# Patient Record
Sex: FEMALE | Race: WHITE | NOT HISPANIC OR LATINO | Employment: UNEMPLOYED | URBAN - METROPOLITAN AREA
[De-identification: names, ages, dates, MRNs, and addresses within clinical notes are randomized per-mention and may not be internally consistent; named-entity substitution may affect disease eponyms.]

---

## 2024-02-12 ENCOUNTER — APPOINTMENT (OUTPATIENT)
Dept: RADIOLOGY | Facility: HOSPITAL | Age: 44
End: 2024-02-12
Payer: MEDICAID

## 2024-02-12 ENCOUNTER — HOSPITAL ENCOUNTER (EMERGENCY)
Facility: HOSPITAL | Age: 44
Discharge: HOME | End: 2024-02-12
Attending: EMERGENCY MEDICINE
Payer: MEDICAID

## 2024-02-12 VITALS
BODY MASS INDEX: 20.83 KG/M2 | OXYGEN SATURATION: 98 % | TEMPERATURE: 97.3 F | DIASTOLIC BLOOD PRESSURE: 80 MMHG | WEIGHT: 125 LBS | RESPIRATION RATE: 18 BRPM | HEART RATE: 90 BPM | SYSTOLIC BLOOD PRESSURE: 119 MMHG | HEIGHT: 65 IN
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
## 2024-02-12 DIAGNOSIS — T14.8XXA ABRASION: ICD-10-CM

## 2024-02-12 DIAGNOSIS — S80.01XA CONTUSION OF RIGHT KNEE, INITIAL ENCOUNTER: Primary | ICD-10-CM

## 2024-02-12 PROCEDURE — 72170 X-RAY EXAM OF PELVIS: CPT | Performed by: RADIOLOGY

## 2024-02-12 PROCEDURE — 72170 X-RAY EXAM OF PELVIS: CPT

## 2024-02-12 PROCEDURE — 73564 X-RAY EXAM KNEE 4 OR MORE: CPT | Mod: RIGHT SIDE | Performed by: RADIOLOGY

## 2024-02-12 PROCEDURE — 73552 X-RAY EXAM OF FEMUR 2/>: CPT | Mod: RIGHT SIDE | Performed by: RADIOLOGY

## 2024-02-12 PROCEDURE — 73590 X-RAY EXAM OF LOWER LEG: CPT | Mod: RT

## 2024-02-12 PROCEDURE — 73564 X-RAY EXAM KNEE 4 OR MORE: CPT | Mod: RT

## 2024-02-12 PROCEDURE — 73552 X-RAY EXAM OF FEMUR 2/>: CPT | Mod: RT

## 2024-02-12 PROCEDURE — 73590 X-RAY EXAM OF LOWER LEG: CPT | Mod: RIGHT SIDE | Performed by: RADIOLOGY

## 2024-02-12 PROCEDURE — 99284 EMERGENCY DEPT VISIT MOD MDM: CPT | Performed by: EMERGENCY MEDICINE

## 2024-02-12 RX ORDER — ACETAMINOPHEN 325 MG/1
975 TABLET ORAL ONCE
Status: COMPLETED | OUTPATIENT
Start: 2024-02-12 | End: 2024-02-12

## 2024-02-12 RX ADMIN — ACETAMINOPHEN 975 MG: 325 TABLET ORAL at 11:25

## 2024-02-12 ASSESSMENT — COLUMBIA-SUICIDE SEVERITY RATING SCALE - C-SSRS
1. IN THE PAST MONTH, HAVE YOU WISHED YOU WERE DEAD OR WISHED YOU COULD GO TO SLEEP AND NOT WAKE UP?: NO
6. HAVE YOU EVER DONE ANYTHING, STARTED TO DO ANYTHING, OR PREPARED TO DO ANYTHING TO END YOUR LIFE?: NO
2. HAVE YOU ACTUALLY HAD ANY THOUGHTS OF KILLING YOURSELF?: NO

## 2024-02-12 ASSESSMENT — PAIN DESCRIPTION - LOCATION: LOCATION: LEG

## 2024-02-12 ASSESSMENT — PAIN SCALES - GENERAL: PAINLEVEL_OUTOF10: 10 - WORST POSSIBLE PAIN

## 2024-02-12 ASSESSMENT — LIFESTYLE VARIABLES
EVER FELT BAD OR GUILTY ABOUT YOUR DRINKING: NO
HAVE PEOPLE ANNOYED YOU BY CRITICIZING YOUR DRINKING: NO
HAVE YOU EVER FELT YOU SHOULD CUT DOWN ON YOUR DRINKING: NO
EVER HAD A DRINK FIRST THING IN THE MORNING TO STEADY YOUR NERVES TO GET RID OF A HANGOVER: NO

## 2024-02-12 ASSESSMENT — PAIN DESCRIPTION - ORIENTATION: ORIENTATION: RIGHT

## 2024-02-12 ASSESSMENT — PAIN - FUNCTIONAL ASSESSMENT: PAIN_FUNCTIONAL_ASSESSMENT: 0-10

## 2024-02-12 NOTE — ED PROVIDER NOTES
HPI   Chief Complaint   Patient presents with    Leg Pain     Dog pulled her to the ground having right knee and right hip pain abrasions to right knee difficulty bearing weight        Jorge is a 43-year-old female presents with pain in her right leg.  She was walking her dog yesterday and the dog saw a chipmunk.  She was pulled down and then dragged approximately 50 yards.  She had scrapes to her right knee and bruising near her right hip.  She has pain in the right hip and knee and is able to ambulate but it is uncomfortable.  She says her pain is a 10 out of 10 she took some Aleve yesterday without much improvement.  Her last tetanus was a year ago.  She denies loss of consciousness does not take any blood thinners.  She denies any neck or back pain.  No left arm or leg pain.  No right arm pain.  She denies any chest or abdominal discomfort.                          Pop Coma Scale Score: 15                     Patient History   Past Medical History:   Diagnosis Date    Personal history of malignant neoplasm of breast     History of malignant neoplasm of breast    Personal history of other diseases of the circulatory system     History of Raynaud's syndrome     Past Surgical History:   Procedure Laterality Date    OTHER SURGICAL HISTORY  2018    Hip surgery    OTHER SURGICAL HISTORY  2018    Hysterectomy    OTHER SURGICAL HISTORY  2021    Oophorectomy    OTHER SURGICAL HISTORY  2020    Tubal ligation    OTHER SURGICAL HISTORY  2020     section    OTHER SURGICAL HISTORY  2020    Mastectomy bilateral    OTHER SURGICAL HISTORY  2020    Foot surgery    OTHER SURGICAL HISTORY  2020    Salpingo-oophorectomy    OTHER SURGICAL HISTORY  2020    Knee surgery    OTHER SURGICAL HISTORY  2020    Exploratory laparotomy    OTHER SURGICAL HISTORY  2020    Laparoscopy    XR CHEST PACEMAKER WITH FLUORO  10/23/2020    XR CHEST PACEMAKER WITH FLUORO  10/23/2020 [facility]     No family history on file.  Social History     Tobacco Use    Smoking status: Never    Smokeless tobacco: Never   Substance Use Topics    Alcohol use: Yes     Comment: occasionally    Drug use: Never       Physical Exam   ED Triage Vitals [02/12/24 1028]   Temperature Heart Rate Respirations BP   36.3 °C (97.3 °F) 90 18 119/80      Pulse Ox Temp Source Heart Rate Source Patient Position   98 % Skin Monitor Lying      BP Location FiO2 (%)     Right arm --       Physical Exam  Vitals reviewed.   Constitutional:       General: She is awake.      Appearance: Normal appearance.   HENT:      Head: Normocephalic.      Nose: Nose normal.   Cardiovascular:      Rate and Rhythm: Normal rate and regular rhythm.   Pulmonary:      Effort: Pulmonary effort is normal.      Breath sounds: Normal breath sounds.   Abdominal:      Palpations: Abdomen is soft.   Musculoskeletal:      Cervical back: Normal range of motion.      Comments: Range of motion of the right knee due to discomfort.  Patient is able to keep leg straight and partially bend.  Also has pain near the right hip ROM which is limited due to pain.    Skin:     General: Skin is warm.      Capillary Refill: Capillary refill takes less than 2 seconds.      Comments: Abrasions on the lateral portion of her right leg at the knee and below.  Not currently bleeding did not appear infected they are partially scabbed over    Bruising noted in the right hip area on the lateral aspect   Neurological:      Mental Status: She is alert.         ED Course & MDM   ED Course as of 02/12/24 1435   Mon Feb 12, 2024   1100 Patient we worked up in ED with x-rays of the pelvis femur knee and tib-fib.  She will be given Tylenol for pain control.  She does not want ice.  X-rays requested. [RZ]   1245 Please with the patient she reports pain all over.  She has had previous ankle fractures.  I talked her about the possibility that there is a fracture currently  although that does not seem to be her worst pain.  She requests an orthopedic boot.  I talked with the risk benefits alternatives and also talked her about crutches.  She does not want crutches at this time due to her breast implants.  Patient is stable at this time will be discharged home and follow-up with her orthopedic physician at [facility].  Her tetanus is up-to-date she does not want ice. [RZ]      ED Course User Index  [RZ] Diego Box MD         Diagnoses as of 02/12/24 1435   Contusion of right knee, initial encounter   Fall, initial encounter   Abrasion       Medical Decision Making      Procedure  Procedures     Diego Box MD  02/12/24 1246       Diego Box MD  02/12/24 1435

## 2024-02-12 NOTE — ED TRIAGE NOTES
Patient here for leg pain Dog pulled her to the ground having right knee and right hip pain abrasions to right knee difficulty bearing weight

## 2025-04-26 ENCOUNTER — HOSPITAL ENCOUNTER (EMERGENCY)
Facility: HOSPITAL | Age: 45
Discharge: HOME | End: 2025-04-27
Payer: MEDICAID

## 2025-04-26 ENCOUNTER — APPOINTMENT (OUTPATIENT)
Dept: RADIOLOGY | Facility: HOSPITAL | Age: 45
End: 2025-04-26
Payer: MEDICAID

## 2025-04-26 VITALS
RESPIRATION RATE: 18 BRPM | TEMPERATURE: 98.6 F | WEIGHT: 135 LBS | HEART RATE: 97 BPM | OXYGEN SATURATION: 97 % | HEIGHT: 65 IN | DIASTOLIC BLOOD PRESSURE: 74 MMHG | SYSTOLIC BLOOD PRESSURE: 113 MMHG | BODY MASS INDEX: 22.49 KG/M2

## 2025-04-26 DIAGNOSIS — S93.401A SPRAIN OF RIGHT ANKLE, INITIAL ENCOUNTER: Primary | ICD-10-CM

## 2025-04-26 PROCEDURE — 73610 X-RAY EXAM OF ANKLE: CPT | Mod: RIGHT SIDE | Performed by: RADIOLOGY

## 2025-04-26 PROCEDURE — 73630 X-RAY EXAM OF FOOT: CPT | Mod: RIGHT SIDE | Performed by: RADIOLOGY

## 2025-04-26 PROCEDURE — 73610 X-RAY EXAM OF ANKLE: CPT | Mod: RT

## 2025-04-26 PROCEDURE — 99284 EMERGENCY DEPT VISIT MOD MDM: CPT

## 2025-04-26 PROCEDURE — 73630 X-RAY EXAM OF FOOT: CPT | Mod: RT

## 2025-04-26 ASSESSMENT — PAIN SCALES - GENERAL: PAINLEVEL_OUTOF10: 9

## 2025-04-26 ASSESSMENT — COLUMBIA-SUICIDE SEVERITY RATING SCALE - C-SSRS
6. HAVE YOU EVER DONE ANYTHING, STARTED TO DO ANYTHING, OR PREPARED TO DO ANYTHING TO END YOUR LIFE?: NO
2. HAVE YOU ACTUALLY HAD ANY THOUGHTS OF KILLING YOURSELF?: NO
1. IN THE PAST MONTH, HAVE YOU WISHED YOU WERE DEAD OR WISHED YOU COULD GO TO SLEEP AND NOT WAKE UP?: NO

## 2025-04-26 ASSESSMENT — PAIN - FUNCTIONAL ASSESSMENT: PAIN_FUNCTIONAL_ASSESSMENT: 0-10

## 2025-04-26 ASSESSMENT — PAIN DESCRIPTION - FREQUENCY: FREQUENCY: CONSTANT/CONTINUOUS

## 2025-04-26 ASSESSMENT — PAIN DESCRIPTION - ORIENTATION: ORIENTATION: RIGHT

## 2025-04-26 ASSESSMENT — PAIN DESCRIPTION - LOCATION: LOCATION: ANKLE

## 2025-04-27 PROCEDURE — 2500000001 HC RX 250 WO HCPCS SELF ADMINISTERED DRUGS (ALT 637 FOR MEDICARE OP): Performed by: PHYSICIAN ASSISTANT

## 2025-04-27 RX ORDER — TIZANIDINE 2 MG/1
2 TABLET ORAL EVERY 6 HOURS PRN
Qty: 30 TABLET | Refills: 0 | Status: SHIPPED | OUTPATIENT
Start: 2025-04-27 | End: 2025-05-07

## 2025-04-27 RX ORDER — TRAMADOL HYDROCHLORIDE 50 MG/1
50 TABLET ORAL ONCE
Status: COMPLETED | OUTPATIENT
Start: 2025-04-27 | End: 2025-04-27

## 2025-04-27 RX ORDER — TRAMADOL HYDROCHLORIDE 50 MG/1
50 TABLET ORAL EVERY 6 HOURS PRN
Qty: 10 TABLET | Refills: 0 | Status: SHIPPED | OUTPATIENT
Start: 2025-04-27 | End: 2025-04-30

## 2025-04-27 RX ADMIN — TRAMADOL HYDROCHLORIDE 50 MG: 50 TABLET, COATED ORAL at 00:43

## 2025-04-27 NOTE — ED PROVIDER NOTES
EMERGENCY MEDICINE EVALUATION NOTE    History of Present Illness     Chief Complaint:   Chief Complaint   Patient presents with    Ankle Pain       HPI: Jorge Cloud is a 44 y.o. female presents with a chief complaint of right ankle pain.  Patient reports that she was at a bar earlier when she was sitting on a barstool and her right foot fell asleep.  She states that she went to step on the foot and awkwardly rolled.  She states that she has a lot of swelling and pain in the right ankle secondary to this.  She reports he does have a history of previous surgeries and has some hardware in there and she is concerned that her may have disrupted this.  Patient reports that she has significant pain when she moves the foot at all.  She has a low bit of tingling in the toes.  She denies anything at home for symptoms.  Patient denies any medication allergies.    Previous History   Medical History[1]  Surgical History[2]  Social History[3]  Family History[4]  Allergies[5]  Current Outpatient Medications   Medication Instructions    tiZANidine (ZANAFLEX) 2 mg, oral, Every 6 hours PRN    traMADol (ULTRAM) 50 mg, oral, Every 6 hours PRN       Physical Exam     Appearance: Alert, oriented , cooperative     Skin: Intact,  dry skin, no lesions, rash, petechiae or purpura.      Eyes: PERRLA, EOMs intact,  Conjunctiva pink      ENT: Hearing grossly intact. Pharynx clear     Neck: Supple. Trachea at midline.      Pulmonary: Clear bilaterally. No rales, rhonchi or wheezing. No accessory muscle use or stridor.     Cardiac: Normal rate and rhythm without murmur     Abdomen: Soft, nontender, active bowel sounds.     Musculoskeletal: Range of motion of the right ankle decreased secondary to pain.  Large amount of swelling well located over the lateral malleolus.  Neurovascular intact with strong pulses.  Patient does have slight decrease in sensation to the tips lateral toes of the right foot but she does have sensation at the base of the  "toes that is intact.  Any palpation of the dorsal aspect of the foot or the lateral aspect of the ankle causes a significant amount of pain.     Neurological:Cranial nerves II through XII are grossly intact, normal sensation, no weakness, no focal findings identified.     Results   Labs Reviewed - No data to display  XR ankle right 3+ views   Final Result   No acute fracture.             MACRO:   None        Signed by: Daniel Garcia 4/27/2025 12:23 AM   Dictation workstation:   [id number]      XR foot right 3+ views   Final Result   No acute fracture.             MACRO:   None        Signed by: Daniel Garcia 4/27/2025 12:21 AM   Dictation workstation:   [id number]            ED Course & Medical Decision Making     Medications   traMADol (Ultram) tablet 50 mg (has no administration in time range)     Heart Rate:  [97]   Temperature:  [37 °C (98.6 °F)]   Respirations:  [18]   BP: (113)/(74)   Height:  [165.1 cm (5' 5\")]   Weight:  [61.2 kg (135 lb)]   Pulse Ox:  [97 %]    ED Course as of 04/27/25 0033   Sun Apr 27, 2025   0028 The patient on the results of her workup.  Patient states that she is currently having worsening pain.  Informed her that there is pain medication ordered however they are waiting for the pharmacy to clear it.  Was admitted that there is no acute fractures present present on the x-rays.  Patient will be discharged home at this time to follow-up as an outpatient.  She was encouraged to take pain medication as prescribed.  She will be given tramadol for home as well as some tizanidine.  Patient will be placed in a walking boot by nursing staff as well as given crutches by nursing staff.  Patient encouraged return to the emergency department with worsening symptoms or to follow-up with her primary care provider as an outpatient. [CJ]      ED Course User Index  [CJ] Mario Swain PA-C         Diagnoses as of 04/27/25 0033   Sprain of right ankle, initial encounter       Procedures   Splint " Application    Performed by: Mario Swain PA-C  Authorized by: Mario Swain PA-C    Consent:     Consent obtained:  Verbal    Consent given by:  Patient    Risks, benefits, and alternatives were discussed: yes      Risks discussed:  Discoloration, numbness, pain and swelling    Alternatives discussed:  No treatment  Universal protocol:     Patient identity confirmed:  Verbally with patient  Pre-procedure details:     Distal neurologic exam:  Normal    Distal perfusion: distal pulses strong and brisk capillary refill    Procedure details:     Location:  Ankle    Ankle location:  R ankle    Supplies:  Prefabricated splint (walking boot)    Splint applied by::  Nurse    Supervision: I personally supervised and inspected the splint/strap which was applied. The extremity is appropriately immobilized. Patient neurovascularly intact before and after the splint application.    Post-procedure details:     Distal neurologic exam:  Normal    Distal perfusion: brisk capillary refill      Procedure completion:  Tolerated well, no immediate complications    Post-procedure imaging: not applicable        Diagnosis     1. Sprain of right ankle, initial encounter        Disposition   Discharge    ED Prescriptions       Medication Sig Dispense Start Date End Date Auth. Provider    traMADol (Ultram) 50 mg tablet Take 1 tablet (50 mg) by mouth every 6 hours if needed for severe pain (7 - 10) for up to 3 days. 10 tablet 4/27/2025 4/30/2025 Mario Swain PA-C    tiZANidine (Zanaflex) 2 mg tablet Take 1 tablet (2 mg) by mouth every 6 hours if needed for muscle spasms for up to 10 days. 30 tablet 4/27/2025 5/7/2025 Mario Swain PA-C            Disclaimer: This note was dictated by speech recognition. Minor errors in transcription may be present. Please call if questions.         [1]   Past Medical History:  Diagnosis Date    Personal history of malignant neoplasm of breast     History of malignant neoplasm of breast    Personal history  of other diseases of the circulatory system     History of Raynaud's syndrome   [2]   Past Surgical History:  Procedure Laterality Date    OTHER SURGICAL HISTORY  2018    Hip surgery    OTHER SURGICAL HISTORY  2018    Hysterectomy    OTHER SURGICAL HISTORY  2021    Oophorectomy    OTHER SURGICAL HISTORY  2020    Tubal ligation    OTHER SURGICAL HISTORY  2020     section    OTHER SURGICAL HISTORY  2020    Mastectomy bilateral    OTHER SURGICAL HISTORY  2020    Foot surgery    OTHER SURGICAL HISTORY  2020    Salpingo-oophorectomy    OTHER SURGICAL HISTORY  2020    Knee surgery    OTHER SURGICAL HISTORY  2020    Exploratory laparotomy    OTHER SURGICAL HISTORY  2020    Laparoscopy    XR CHEST PACEMAKER WITH FLUORO  10/23/2020    XR CHEST PACEMAKER WITH FLUORO 10/23/2020 [facility]   [3]   Social History  Tobacco Use    Smoking status: Never    Smokeless tobacco: Never   Substance Use Topics    Alcohol use: Yes     Comment: occasionally    Drug use: Never   [4] No family history on file.  [5]   Allergies  Allergen Reactions    Clindamycin Hives and Itching    Doxycycline Hives and Unknown    Levofloxacin Hives and Unknown    Metronidazole Hives and Unknown    Naproxen Hives and Unknown    Oxycodone-Acetaminophen Hives, Swelling and Unknown    Penicillins Hives    Sulfa (Sulfonamide Antibiotics) Hives    Latex Hives and Rash        Mario Swain PA-C  25 0034

## 2025-06-19 ENCOUNTER — APPOINTMENT (OUTPATIENT)
Dept: RADIOLOGY | Facility: HOSPITAL | Age: 45
End: 2025-06-19
Payer: MEDICAID

## 2025-06-19 DIAGNOSIS — M76.71 PERONEAL TENDINITIS, RIGHT LEG: ICD-10-CM

## 2025-06-19 DIAGNOSIS — S93.401A SPRAIN OF UNSPECIFIED LIGAMENT OF RIGHT ANKLE, INITIAL ENCOUNTER: ICD-10-CM

## 2025-06-19 DIAGNOSIS — S82.64XA NONDISPLACED FRACTURE OF LATERAL MALLEOLUS OF RIGHT FIBULA, INITIAL ENCOUNTER FOR CLOSED FRACTURE: ICD-10-CM

## 2025-06-19 PROCEDURE — 73721 MRI JNT OF LWR EXTRE W/O DYE: CPT | Mod: RT
